# Patient Record
Sex: FEMALE | Race: BLACK OR AFRICAN AMERICAN
[De-identification: names, ages, dates, MRNs, and addresses within clinical notes are randomized per-mention and may not be internally consistent; named-entity substitution may affect disease eponyms.]

---

## 2019-04-05 ENCOUNTER — HOSPITAL ENCOUNTER (OUTPATIENT)
Dept: HOSPITAL 62 - OROUT | Age: 39
Discharge: HOME | End: 2019-04-05
Attending: OBSTETRICS & GYNECOLOGY
Payer: COMMERCIAL

## 2019-04-05 VITALS — SYSTOLIC BLOOD PRESSURE: 123 MMHG | DIASTOLIC BLOOD PRESSURE: 81 MMHG

## 2019-04-05 DIAGNOSIS — J45.909: ICD-10-CM

## 2019-04-05 DIAGNOSIS — Z79.899: ICD-10-CM

## 2019-04-05 DIAGNOSIS — O02.1: Primary | ICD-10-CM

## 2019-04-05 DIAGNOSIS — Z79.51: ICD-10-CM

## 2019-04-05 DIAGNOSIS — F17.210: ICD-10-CM

## 2019-04-05 LAB
APPEARANCE UR: CLEAR
APTT PPP: (no result) S
BILIRUB UR QL STRIP: NEGATIVE
ERYTHROCYTE [DISTWIDTH] IN BLOOD BY AUTOMATED COUNT: 13.6 % (ref 11.5–14)
GLUCOSE UR STRIP-MCNC: NEGATIVE MG/DL
HCT VFR BLD CALC: 36.8 % (ref 36–47)
HGB BLD-MCNC: 12 G/DL (ref 12–15.5)
KETONES UR STRIP-MCNC: NEGATIVE MG/DL
MCH RBC QN AUTO: 24.8 PG (ref 27–33.4)
MCHC RBC AUTO-ENTMCNC: 32.5 G/DL (ref 32–36)
MCV RBC AUTO: 76 FL (ref 80–97)
NITRITE UR QL STRIP: NEGATIVE
PH UR STRIP: 6 [PH] (ref 5–9)
PLATELET # BLD: 202 10^3/UL (ref 150–450)
PROT UR STRIP-MCNC: NEGATIVE MG/DL
RBC # BLD AUTO: 4.83 10^6/UL (ref 3.72–5.28)
SP GR UR STRIP: 1.01
UROBILINOGEN UR-MCNC: NEGATIVE MG/DL (ref ?–2)
WBC # BLD AUTO: 7.3 10^3/UL (ref 4–10.5)

## 2019-04-05 PROCEDURE — 86850 RBC ANTIBODY SCREEN: CPT

## 2019-04-05 PROCEDURE — 86901 BLOOD TYPING SEROLOGIC RH(D): CPT

## 2019-04-05 PROCEDURE — 94640 AIRWAY INHALATION TREATMENT: CPT

## 2019-04-05 PROCEDURE — 36415 COLL VENOUS BLD VENIPUNCTURE: CPT

## 2019-04-05 PROCEDURE — 85027 COMPLETE CBC AUTOMATED: CPT

## 2019-04-05 PROCEDURE — 88305 TISSUE EXAM BY PATHOLOGIST: CPT

## 2019-04-05 PROCEDURE — 59820 CARE OF MISCARRIAGE: CPT

## 2019-04-05 PROCEDURE — 86900 BLOOD TYPING SEROLOGIC ABO: CPT

## 2019-04-05 PROCEDURE — 81001 URINALYSIS AUTO W/SCOPE: CPT

## 2019-04-05 PROCEDURE — S0028 INJECTION, FAMOTIDINE, 20 MG: HCPCS

## 2019-04-05 NOTE — OPERATIVE REPORT
Operative Report


DATE OF SURGERY: 04/05/19


PREOPERATIVE DIAGNOSIS: 1.  Blighted ovum at 8 weeks.  2.  Tobacco use.  3.  Rh

+.  4.  Asthma


POSTOPERATIVE DIAGNOSIS: Same


OPERATION: Suction D&C


SURGEON: JAYDEN MATHUR


ANESTHESIA: GA


TISSUE REMOVED OR ALTERED: Products of conception


COMPLICATIONS: 


None





ESTIMATED BLOOD LOSS: 100 ml


INTRAOPERATIVE FINDINGS: Uterus sounded 10 cm; 8 mm curved Slovak curette used; 

moderate amounts of products of conception


PROCEDURE: 


The patient was taken to the Operating Room where general anesthesia was 

obtained without difficulty.  She was prepped and draped in the normal sterile 

fashion in the dorsal lithotomy position.  Exam under anesthesia was performed 

and noted above.  A speculum was placed in the vagina.  The anterior cervix was 

grasped with a single-tooth tenaculum and the uterus sounded to 10 cm.  The 

cervix was noted to be closed at the beginning of the procedure. Sequential 

dilators were then used to dilate the cervix to accommodate the 8 mm suction 

curet curved.  The 8 mm curved suction curet was gently advanced in the usual 

fashion and good return of tissue.  The suction device was then activated and 

the curet rotated to clear the uterus of the products of conception.  A sharp 

curettage was then performed.  The suction device was then gently reintroduced 

and activated and the curet was rotated to clear the uterus of conception which 

were loosened with the recent sharp curettage.  The sharp curettage was then 

performed again until a gritty texture was noted and the cavity was felt to be 

empty of further tissue.  At this time there was minimal bleeding noted from the

cervix.  All instruments were removed from the patient's cervix and vagina.  The

tenaculum site was hemostatic.





Sponge, lap and instrument counts are correct 2.  Doxycycline 100 mg IV was 

given perioperatively. The patient was also given Cytotec 800 mcg per rectum at 

the end of the procedure.  The patient tolerated the procedure well and was 

taken to the recovery area awake and in stable condition.